# Patient Record
Sex: FEMALE | Race: WHITE | NOT HISPANIC OR LATINO
[De-identification: names, ages, dates, MRNs, and addresses within clinical notes are randomized per-mention and may not be internally consistent; named-entity substitution may affect disease eponyms.]

---

## 2021-07-01 ENCOUNTER — TRANSCRIPTION ENCOUNTER (OUTPATIENT)
Age: 36
End: 2021-07-01

## 2022-10-31 ENCOUNTER — APPOINTMENT (OUTPATIENT)
Dept: ORTHOPEDIC SURGERY | Facility: CLINIC | Age: 37
End: 2022-10-31

## 2022-10-31 VITALS — WEIGHT: 128 LBS | BODY MASS INDEX: 24.17 KG/M2 | HEIGHT: 61 IN

## 2022-10-31 DIAGNOSIS — S39.012A STRAIN OF MUSCLE, FASCIA AND TENDON OF LOWER BACK, INITIAL ENCOUNTER: ICD-10-CM

## 2022-10-31 DIAGNOSIS — M41.125 ADOLESCENT IDIOPATHIC SCOLIOSIS, THORACOLUMBAR REGION: ICD-10-CM

## 2022-10-31 PROBLEM — Z00.00 ENCOUNTER FOR PREVENTIVE HEALTH EXAMINATION: Status: ACTIVE | Noted: 2022-10-31

## 2022-10-31 PROCEDURE — 72100 X-RAY EXAM L-S SPINE 2/3 VWS: CPT

## 2022-10-31 PROCEDURE — 99204 OFFICE O/P NEW MOD 45 MIN: CPT

## 2022-10-31 RX ORDER — METHOCARBAMOL 750 MG/1
750 TABLET, FILM COATED ORAL
Qty: 30 | Refills: 1 | Status: ACTIVE | COMMUNITY
Start: 2022-10-31 | End: 1900-01-01
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
## 2022-10-31 NOTE — DISCUSSION/SUMMARY
[de-identified] : Discussed and reviewed results of lumbar x-ray, and pathology of her symptoms associated with lumbar scoliosis. Discussed judicious use otc nsaids prn. Patient was provided with a referral for lumbar physical therapy to work on stretching, strengthening and range of motion. Patient was provided with methocarbamol prescription to use at nighttime. \par \par Prior to appointment and during encounter with patient extensive medical records were reviewed including but not limited to, hospital records, outpatient records, imaging results, and lab data.During this appointment the patient was examined, diagnoses were discussed and explained in a face to face manner. In addition extensive time was spent reviewing aforementioned diagnostic studies. Counseling including abnormal image results, differential diagnoses, treatment options, risk and benefits, lifestyle changes, current condition, and current medications was performed. Patient's comments, questions, and concerns were addressed and patient verbalized understanding. Based on this patient's presentation at our office, which is an orthopedic spine surgeon's office, this patient inherently / intrinsically has a risk, however minute, of developing issues such as Cauda equina syndrome, bowel and bladder changes, or progression of motor or neurological deficits such as paralysis which may be permanent.\par \par ARINA SULLIVAN Acting as a Scribe for Dr. Chapis DANIELLE, Arina Sullivan, attest that this documentation has been prepared under the direction and in the presence of Provider Nicholas Mccormack MD.

## 2022-10-31 NOTE — PHYSICAL EXAM
[Extension] : extension [Flexion] : flexion [Normal Coordination] : normal coordination [Normal DTR UE/LE] : normal DTR UE/LE  [Normal Sensation] : normal sensation [Normal Mood and Affect] : normal mood and affect [Orientated] : orientated [Able to Communicate] : able to communicate [Normal Skin] : normal skin [No Rash] : no rash [No Ulcers] : no ulcers [No Lesions] : no lesions [No obvious lymphadenopathy in areas examined] : no obvious lymphadenopathy in areas examined [Well Developed] : well developed [Well Nourished] : well nourished [Peripheral vascular exam is grossly normal] : peripheral vascular exam is grossly normal [No Respiratory Distress] : no respiratory distress [de-identified] : Constitutional:\par - General Appearance:\par Unremarkable\par Body Habitus\par Well Developed\par Well Nourished\par Body Habitus\par No Deformities\par Well Groomed\par Ability To communicate:\par Normal\par Neurologic:\par Global sensation is intact to upper and lower extremities. See examination of Neck and/or Spine\par for exceptions.\par Orientation to Time, Place and Person is: Normal\par Mood And Affect is Normal\par Skin:\par - Head/Face, Right Upper/Lower Extremity, Left Upper/Lower Extremity: Normal\par See Examination of Neck and/or Spine for exceptions\par Cardiovascular:\par Peripheral Cardiovascular System is Normal\par Palpation of Lymph Nodes:\par Normal Palpation of lymph nodes in: Axilla, Cervical, Inguinal\par Abnormal Palpation of lymph nodes in: None  [] : non-antalgic [FreeTextEntry1] : lumbar scoliosis

## 2022-11-09 ENCOUNTER — APPOINTMENT (OUTPATIENT)
Dept: ORTHOPEDIC SURGERY | Facility: CLINIC | Age: 37
End: 2022-11-09

## 2022-11-09 VITALS — WEIGHT: 128 LBS | BODY MASS INDEX: 24.17 KG/M2 | HEIGHT: 61 IN

## 2022-11-09 DIAGNOSIS — M25.512 PAIN IN LEFT SHOULDER: ICD-10-CM

## 2022-11-09 DIAGNOSIS — M25.511 PAIN IN RIGHT SHOULDER: ICD-10-CM

## 2022-11-09 PROCEDURE — 99204 OFFICE O/P NEW MOD 45 MIN: CPT

## 2022-11-09 PROCEDURE — 73030 X-RAY EXAM OF SHOULDER: CPT | Mod: 50

## 2022-11-09 RX ORDER — DICLOFENAC SODIUM 1 %
1 KIT TOPICAL DAILY
Qty: 1 | Refills: 0 | Status: ACTIVE | COMMUNITY
Start: 2022-11-09 | End: 1900-01-01

## 2022-11-09 NOTE — PHYSICAL EXAM
[Bilateral] : shoulder bilaterally [There are no fractures, subluxations or dislocations. No significant abnormalities are seen] : There are no fractures, subluxations or dislocations. No significant abnormalities are seen [No bony abnormalities] : No bony abnormalities [Type 2 acromion] : Type 2 acromion [de-identified] : Constitutional: The general appearance of the patient is well developed, well nourished, no deformities and well groomed. Normal \par \par Gait: Gait and function is as follows: normal gait. \par \par Skin: Head and neck visualized skin is normal. Left upper extremity visualized skin is normal. Right upper extremity visualized skin is normal. Thoracic Skin of the thoracic spine shows visualized skin is normal. \par \par Cardiovascular: palpable radial pulse bilaterally, good capillary refill in digits of the bilateral upper extremities and no temperature or color changes in the bilateral upper extremities. \par \par Lymphatic: Normal Palpation of lymph nodes in the cervical. \par \par Neurologic: fine motor control in the bilateral upper extremities is intact. Deep Tendon Reflexes in Upper and Lower Extremities Negative Anderson's in the bilateral upper extremities. The patient is oriented to time, place and person. Sensation to light touch intact in the bilateral upper extremities. Mood and Affect is normal. \par \par Right Shoulder: Inspection of the shoulder/upper arm is as follows: Stable shoulder. Palpation of the shoulder/upper arm is as follows: There is tenderness at the AC joint, proximal biceps tendon and supraspinatus tendon. Range of motion of the shoulder is as follows: Pain with internal rotation, external rotation, abduction and forward flexion. Strength of the shoulder is as follows: Supraspinatus 4/5. External Rotation 4/5. Internal Rotation 4/5. Infraspinatus 5/5 4/5. Deltoid 5/5 Ligament Stability and Special Tests of the shoulder is as follows: Neer test is positive. Son' test is positive. Speed's test is positive. \par \par Left Shoulder: Inspection of the shoulder/upper arm is as follows: Stable shoulder. Palpation of the shoulder/upper arm is as follows: There is tenderness at the AC joint, proximal biceps tendon and supraspinatus tendon. Range of motion of the shoulder is as follows: Pain with internal rotation, external rotation, abduction and forward flexion. Strength of the shoulder is as follows: Supraspinatus 4/5. External Rotation 4/5. Internal Rotation 4/5. Infraspinatus 5/5 4/5. Deltoid 5/5 Ligament Stability and Special Tests of the shoulder is as follows: Neer test is positive. Son' test is positive. Speed's test is positive.\par \par Neck: Inspection / Palpation of the cervical spine is as follows: There is a full, pain free, stable range of motion of the cervical spine with normal tone and no tenderness to palpation. \par \par Back, including spine: Inspection / Palpation of the thoracic/lumbar spine is as follows: There is a full, pain free, stable range of motion of the thoracic spine with a normal tone and not tenderness to palpation.. \par \par

## 2022-11-09 NOTE — HISTORY OF PRESENT ILLNESS
[de-identified] : 36yo female presenting to the office with b/l shoulder pain x 5 years. Reports no history of injury or trauma. She admits to superior shoulder pain with intermittent lateral radiating pain. Currently right shoulder is greater than left. Denies any numbness or tingling. Denies any cervical spine history. SHe has noticed constant crepitus with ROM. Pain is worse with motion away from her body. \par Patient is RHD, no PMhx, works as

## 2022-11-09 NOTE — DISCUSSION/SUMMARY
[de-identified] : RUE: + TTP R AC joint, crepitus with ROM\par pain with cross body adduction\par \par 38yo female presenting to the office with b/l shoulder pain x 5 years. Reports no history of injury or trauma. R>L. \par x-rays today are non-diagnostic \par Maximum point of tenderness is AC joint on exam today\par Recommended MRI to further evaluate for AC joint injury vs cyst \par Recommended use of Voltaren gel 2-3x daily over AC joint region\par Follow up 2-3 weeks for MRI review  \par Can consider  AC joint injection if no significant additional findings on MRI \par \par \par Based on the patient’s history and physical exam findings, I am concerned about the possibility of a full-thickness rotator cuff tear.  The patient has pain and subjective weakness consistent with this diagnosis.  Therefore, I recommend an MRI to evaluate for a rotator cuff tear.  This will also aid in evaluating for injury to the biceps labral complex and for any signs of impingement. The patient will follow-up after MRI to discuss further treatment options.\par (1) We discussed a comprehensive treatment plans that included a prescription management plan involving the use of prescription strength medications to include Ibuprofen 600-800 mg TID, versus 500-650 mg Tylenol. We also discussed prescribing topical diclofenac (Voltaren gel) as well as once daily Meloxicam 15 mg.\par (2) The patient has More Than One chronic injuries/illnesses as outlined, discussed, and documented by ICD 10 codes listed, as well as the HPI and Plan section.\par There is a moderate risk of morbidity with further treatment, especially from use of prescription strength medications and possible side effects of these medications which include upset stomach and cardiac/renal issues with long term use were discussed.\par (3) I recommended that the patient follow-up with their medical physician to discuss any significant specific potential issues with long term use such as interactions with current medications or with exacerbation of underlying medical morbidities. \par \par Attestation:\par I, Ariella Boyce , attest that this documentation has been prepared under the direction and in the presence of Provider Mayank Porras MD.\par The documentation recorded by the scribe, in my presence, accurately reflects the service I personally performed, and the decisions made by me with my edits as appropriate.\par Mayank Porras MD\par \par

## 2022-11-19 ENCOUNTER — APPOINTMENT (OUTPATIENT)
Dept: MRI IMAGING | Facility: CLINIC | Age: 37
End: 2022-11-19

## 2022-11-25 ENCOUNTER — APPOINTMENT (OUTPATIENT)
Dept: MRI IMAGING | Facility: CLINIC | Age: 37
End: 2022-11-25

## 2022-11-28 ENCOUNTER — APPOINTMENT (OUTPATIENT)
Dept: ORTHOPEDIC SURGERY | Facility: CLINIC | Age: 37
End: 2022-11-28